# Patient Record
Sex: MALE | Race: WHITE | NOT HISPANIC OR LATINO | URBAN - METROPOLITAN AREA
[De-identification: names, ages, dates, MRNs, and addresses within clinical notes are randomized per-mention and may not be internally consistent; named-entity substitution may affect disease eponyms.]

---

## 2020-07-05 ENCOUNTER — HISTORICAL (OUTPATIENT)
Dept: URGENT CARE | Facility: CLINIC | Age: 30
End: 2020-07-05

## 2022-11-11 ENCOUNTER — OFFICE VISIT (OUTPATIENT)
Dept: FAMILY MEDICINE | Facility: CLINIC | Age: 32
End: 2022-11-11
Payer: COMMERCIAL

## 2022-11-11 VITALS
HEART RATE: 75 BPM | BODY MASS INDEX: 34.77 KG/M2 | TEMPERATURE: 99 F | OXYGEN SATURATION: 98 % | DIASTOLIC BLOOD PRESSURE: 75 MMHG | RESPIRATION RATE: 16 BRPM | HEIGHT: 71 IN | WEIGHT: 248.38 LBS | SYSTOLIC BLOOD PRESSURE: 120 MMHG

## 2022-11-11 DIAGNOSIS — F41.9 ANXIETY: ICD-10-CM

## 2022-11-11 DIAGNOSIS — Z28.21 INFLUENZA VACCINATION DECLINED BY PATIENT: Primary | ICD-10-CM

## 2022-11-11 DIAGNOSIS — Z00.00 WELLNESS EXAMINATION: ICD-10-CM

## 2022-11-11 DIAGNOSIS — Z13.220 NEED FOR LIPID SCREENING: ICD-10-CM

## 2022-11-11 PROCEDURE — 3078F PR MOST RECENT DIASTOLIC BLOOD PRESSURE < 80 MM HG: ICD-10-PCS | Mod: CPTII,,, | Performed by: FAMILY MEDICINE

## 2022-11-11 PROCEDURE — 3078F DIAST BP <80 MM HG: CPT | Mod: CPTII,,, | Performed by: FAMILY MEDICINE

## 2022-11-11 PROCEDURE — 1160F RVW MEDS BY RX/DR IN RCRD: CPT | Mod: CPTII,,, | Performed by: FAMILY MEDICINE

## 2022-11-11 PROCEDURE — 99203 OFFICE O/P NEW LOW 30 MIN: CPT | Mod: ,,, | Performed by: FAMILY MEDICINE

## 2022-11-11 PROCEDURE — 1159F PR MEDICATION LIST DOCUMENTED IN MEDICAL RECORD: ICD-10-PCS | Mod: CPTII,,, | Performed by: FAMILY MEDICINE

## 2022-11-11 PROCEDURE — 3008F BODY MASS INDEX DOCD: CPT | Mod: CPTII,,, | Performed by: FAMILY MEDICINE

## 2022-11-11 PROCEDURE — 3074F SYST BP LT 130 MM HG: CPT | Mod: CPTII,,, | Performed by: FAMILY MEDICINE

## 2022-11-11 PROCEDURE — 1160F PR REVIEW ALL MEDS BY PRESCRIBER/CLIN PHARMACIST DOCUMENTED: ICD-10-PCS | Mod: CPTII,,, | Performed by: FAMILY MEDICINE

## 2022-11-11 PROCEDURE — 99203 PR OFFICE/OUTPT VISIT, NEW, LEVL III, 30-44 MIN: ICD-10-PCS | Mod: ,,, | Performed by: FAMILY MEDICINE

## 2022-11-11 PROCEDURE — 1159F MED LIST DOCD IN RCRD: CPT | Mod: CPTII,,, | Performed by: FAMILY MEDICINE

## 2022-11-11 PROCEDURE — 3074F PR MOST RECENT SYSTOLIC BLOOD PRESSURE < 130 MM HG: ICD-10-PCS | Mod: CPTII,,, | Performed by: FAMILY MEDICINE

## 2022-11-11 PROCEDURE — 3008F PR BODY MASS INDEX (BMI) DOCUMENTED: ICD-10-PCS | Mod: CPTII,,, | Performed by: FAMILY MEDICINE

## 2022-11-11 RX ORDER — PROPRANOLOL HYDROCHLORIDE 60 MG/1
60 CAPSULE, EXTENDED RELEASE ORAL DAILY
Qty: 90 CAPSULE | Refills: 3 | Status: SHIPPED | OUTPATIENT
Start: 2022-11-11 | End: 2023-12-21

## 2022-11-11 NOTE — ASSESSMENT & PLAN NOTE
Will try propranolol 60 mg xl daily. Side effects discussed with patient. He verbalized understanding  RTC 4 wks for wellness  Will consider wellbutrin if this does not help patient

## 2022-11-11 NOTE — PROGRESS NOTES
Gómez DESIR Clause  11/11/2022  [id number]    SONIA RONDON MD  Patient Care Team:  Sonia Rondon MD as PCP - General (Family Medicine)      Chief Complaint:  Chief Complaint   Patient presents with    Establish Care     New Patient Establish Care       History of Present Illness:  HPI    32 y.o. male who presents today to establish care. He c/o increased stress, anxiety, irritability and feeling on edge.     Review of Systems    General: denies f/c, weight loss, night sweats, decreased appetite  Eye: denies blurred vision, changes in vision  Respiratory: denies sob, wheezing, cough  Cardiovascular: denies chest pain, palpitations, edema  Gastrointestinal: denies abdominal pain, n/v, constipation, diarrhea  Integumentary: denies rashes, pruritis      Health Maintenance  The patient has no Health Maintenance topics of status Not Due  Health Maintenance Due   Topic Date Due    Lipid Panel  Never done    COVID-19 Vaccine (1) Never done    Pneumococcal Vaccines (Age 0-64) (1 - PCV) Never done    TETANUS VACCINE  Never done       Exam:  Vitals:    11/11/22 1041   BP: 120/75   Pulse: 75   Resp: 16   Temp: 99.2 °F (37.3 °C)     Weight: 112.7 kg (248 lb 6.4 oz)   Body mass index is 34.64 kg/m².      Physical Exam  Constitutional: NAD, alert, pleasant  Respiratory: CTAB, no wheezes, rales or rhonchi. No accessory muscle use  Eyes: EOMI  Cardiovascular: RRR, No m/r/g. No JVD. No LE edema  Gastrointestinal: BS+, nontender, nondistended  Integumentary: warm, dry, intact  Psych: AA&Ox3      ICD-10-CM ICD-9-CM   1. Influenza vaccination declined by patient  Z28.21 V64.06   2. Anxiety  F41.9 300.00   3. Wellness examination  Z00.00 V70.0   4. Need for lipid screening  Z13.220 V77.91       1. Influenza vaccination declined by patient    2. Anxiety  Overview:  He c/o increased stress, anxiety, irritability and feeling on edge. He denies depression, si/hi, anhedonia. He states he has had a promotion at work and takes on  a lot more responsibility. PHQ-score neg. He has been on lexapro in the past but it made him numb.       Assessment & Plan:  Will try propranolol 60 mg xl daily. Side effects discussed with patient. He verbalized understanding  RTC 4 wks for wellness  Will consider wellbutrin if this does not help patient    Orders:  -     TSH; Future; Expected date: 11/11/2022  -     propranoloL (INDERAL LA) 60 MG 24 hr capsule; Take 1 capsule (60 mg total) by mouth once daily.  Dispense: 90 capsule; Refill: 3    3. Wellness examination  -     CBC Auto Differential; Future; Expected date: 11/11/2022  -     Comprehensive Metabolic Panel; Future; Expected date: 11/11/2022  -     Lipid Panel; Future; Expected date: 11/11/2022  -     TSH; Future; Expected date: 11/11/2022    4. Need for lipid screening  -     Lipid Panel; Future; Expected date: 11/11/2022       Follow up: Follow up in about 4 weeks (around 12/9/2022) for wellness with labs.      Care Plan/Goals: Reviewed   Goals    None

## 2022-11-18 LAB
CHOLEST SERPL-MSCNC: 155 MG/DL (ref 0–200)
LIPIDS, HDL CHOLESTEROL: 37.7
LIPIDS, LDL CHOLESTEROL: 94
TRIGL SERPL-MCNC: 119 MG/DL
VLDLC SERPL-MCNC: 24 MG/DL

## 2022-12-02 ENCOUNTER — OFFICE VISIT (OUTPATIENT)
Dept: FAMILY MEDICINE | Facility: CLINIC | Age: 32
End: 2022-12-02
Payer: COMMERCIAL

## 2022-12-02 ENCOUNTER — TELEPHONE (OUTPATIENT)
Dept: FAMILY MEDICINE | Facility: CLINIC | Age: 32
End: 2022-12-02

## 2022-12-02 VITALS
HEIGHT: 71 IN | DIASTOLIC BLOOD PRESSURE: 80 MMHG | OXYGEN SATURATION: 98 % | WEIGHT: 254.81 LBS | TEMPERATURE: 99 F | HEART RATE: 81 BPM | SYSTOLIC BLOOD PRESSURE: 125 MMHG | BODY MASS INDEX: 35.67 KG/M2 | RESPIRATION RATE: 16 BRPM

## 2022-12-02 DIAGNOSIS — Z00.00 WELLNESS EXAMINATION: Primary | ICD-10-CM

## 2022-12-02 DIAGNOSIS — J45.20 MILD INTERMITTENT ASTHMA WITHOUT COMPLICATION: ICD-10-CM

## 2022-12-02 DIAGNOSIS — Z91.89 PNEUMOCOCCAL VACCINATION INDICATED: ICD-10-CM

## 2022-12-02 DIAGNOSIS — F41.9 ANXIETY: ICD-10-CM

## 2022-12-02 DIAGNOSIS — F17.200 VAPING NICOTINE DEPENDENCE, NON-TOBACCO PRODUCT: ICD-10-CM

## 2022-12-02 PROCEDURE — 3074F SYST BP LT 130 MM HG: CPT | Mod: CPTII,,, | Performed by: FAMILY MEDICINE

## 2022-12-02 PROCEDURE — 3074F PR MOST RECENT SYSTOLIC BLOOD PRESSURE < 130 MM HG: ICD-10-PCS | Mod: CPTII,,, | Performed by: FAMILY MEDICINE

## 2022-12-02 PROCEDURE — 3079F PR MOST RECENT DIASTOLIC BLOOD PRESSURE 80-89 MM HG: ICD-10-PCS | Mod: CPTII,,, | Performed by: FAMILY MEDICINE

## 2022-12-02 PROCEDURE — 1160F RVW MEDS BY RX/DR IN RCRD: CPT | Mod: CPTII,,, | Performed by: FAMILY MEDICINE

## 2022-12-02 PROCEDURE — 99395 PREV VISIT EST AGE 18-39: CPT | Mod: 25,,, | Performed by: FAMILY MEDICINE

## 2022-12-02 PROCEDURE — 1159F MED LIST DOCD IN RCRD: CPT | Mod: CPTII,,, | Performed by: FAMILY MEDICINE

## 2022-12-02 PROCEDURE — 90677 PCV20 VACCINE IM: CPT | Mod: ,,, | Performed by: FAMILY MEDICINE

## 2022-12-02 PROCEDURE — 1160F PR REVIEW ALL MEDS BY PRESCRIBER/CLIN PHARMACIST DOCUMENTED: ICD-10-PCS | Mod: CPTII,,, | Performed by: FAMILY MEDICINE

## 2022-12-02 PROCEDURE — 99395 PR PREVENTIVE VISIT,EST,18-39: ICD-10-PCS | Mod: 25,,, | Performed by: FAMILY MEDICINE

## 2022-12-02 PROCEDURE — 90471 IMMUNIZATION ADMIN: CPT | Mod: ,,, | Performed by: FAMILY MEDICINE

## 2022-12-02 PROCEDURE — 3079F DIAST BP 80-89 MM HG: CPT | Mod: CPTII,,, | Performed by: FAMILY MEDICINE

## 2022-12-02 PROCEDURE — 3008F PR BODY MASS INDEX (BMI) DOCUMENTED: ICD-10-PCS | Mod: CPTII,,, | Performed by: FAMILY MEDICINE

## 2022-12-02 PROCEDURE — 3008F BODY MASS INDEX DOCD: CPT | Mod: CPTII,,, | Performed by: FAMILY MEDICINE

## 2022-12-02 PROCEDURE — 90471 PNEUMOCOCCAL CONJUGATE VACCINE 20-VALENT: ICD-10-PCS | Mod: ,,, | Performed by: FAMILY MEDICINE

## 2022-12-02 PROCEDURE — 1159F PR MEDICATION LIST DOCUMENTED IN MEDICAL RECORD: ICD-10-PCS | Mod: CPTII,,, | Performed by: FAMILY MEDICINE

## 2022-12-02 PROCEDURE — 90677 PNEUMOCOCCAL CONJUGATE VACCINE 20-VALENT: ICD-10-PCS | Mod: ,,, | Performed by: FAMILY MEDICINE

## 2022-12-02 NOTE — PROGRESS NOTES
Patient ID: [id number]     Chief Complaint: Annual Exam (Wellness Exam)        HPI:     Gómez Bowens is a 32 y.o. male here today for an annual wellness visit. He did not do labs.     Anxiety: currently on propranolol 60 mg XL daily         ----------------------------  Asthma     Past Surgical History:   Procedure Laterality Date    ADENOIDECTOMY      TONSILLECTOMY      VASECTOMY         Review of patient's allergies indicates:  No Known Allergies    Outpatient Medications Marked as Taking for the 12/2/22 encounter (Office Visit) with Sonia Nazario MD   Medication Sig Dispense Refill    propranoloL (INDERAL LA) 60 MG 24 hr capsule Take 1 capsule (60 mg total) by mouth once daily. 90 capsule 3       Social History     Socioeconomic History    Marital status:      Spouse name: CARISSA    Number of children: 3   Tobacco Use    Smoking status: Every Day     Types: Vaping with nicotine    Smokeless tobacco: Former     Types: Chew   Substance and Sexual Activity    Alcohol use: Yes     Alcohol/week: 12.0 standard drinks     Types: 12 Cans of beer per week    Drug use: Never    Sexual activity: Yes        Family History   Problem Relation Age of Onset    Hypertension Father     Hyperlipidemia Father         Patient Care Team:  Sonia Nazario MD as PCP - General (Family Medicine)       Subjective:     ROS    Constitutional: Denies Change in appetite. Denies Chills. Denies Fever. Denies Night sweats.  Eye: Denies changes in vision  ENT: Denies Decreased hearing. Denies Sore throat. Denies Swollen glands.  Respiratory: Denies Cough. Denies Shortness of breath. Denies Shortness of breath with exertion. Denies Wheezing.  Cardiovascular: DeniesChest pain at rest. Denies Chest pain with exertion. Denies Irregular heartbeat. Denies Palpitations. Denies Edema.  Gastrointestinal: Denies Abdominal pain. DeniesDiarrhea. Denies Nausea. Denies Vomiting. Denies Hematemesis or Hematochezia.  Genitourinary:  "Denies Dysuria. Denies Urinary frequency. Denies Urinary urgency. Denies Blood in urine.  Endocrine: Denies Cold intolerance. Denies Excessive thirst. Denies Heat intolerance. Denies Weight loss. Denies Weight gain.  Musculoskeletal: Denies Painful joints. Denies Weakness.  Integumentary: Denies Rash. Denies Itching. Denies Dry skin.  Neurologic: Denies Dizziness. Denies Fainting. Denies Headache.  Psychiatric: Denies Depression. Denies Anxiety. Denies Suicidal/Homicidal ideations.    All Other ROS: Negative except as stated in HPI.       Objective:     /80 (BP Location: Left arm, Patient Position: Sitting, BP Method: Large (Manual))   Pulse 81   Temp 98.8 °F (37.1 °C) (Temporal)   Resp 16   Ht 5' 11" (1.803 m)   Wt 115.6 kg (254 lb 12.8 oz)   SpO2 98%   BMI 35.54 kg/m²     Physical Exam    General: Alert and oriented, No acute distress.  Head: Normocephalic, Atraumatic.  Eye: Pupils are equal, round and reactive to light, Extraocular movements are intact, Sclera non-icteric.  Ears/Nose/Throat: TM+ light reflexes bilaterally. Normal, Mucosa moist,Clear. Teeth intact, no lesions of tongue, palate, mucosa.  Respiratory: Clear to auscultation bilaterally; No wheezes, rales or rhonchi, Non-labored respirations, Symmetrical chest wall expansion.  Cardiovascular: Regular rate and rhythm, S1/S2 normal, No murmurs, rubs or gallops.  Gastrointestinal: Soft, Non-tender, Non-distended, Normal bowel sounds, No palpable organomegaly.  Integumentary: Warm, Dry, Intact, No suspicious lesions or rashes.  Extremities: No clubbing, cyanosis or edema  Psychiatric: Normal interaction, Coherent speech, Euthymic mood, Appropriate affect       Assessment:     Problem List Items Addressed This Visit    None      Plan:       The patient has no Health Maintenance topics of status Not Due     Cervical Cancer Screening - Last Pap on. Follow up with [facility] Clinic for Pap/Pelvic.    Breast Cancer Screening - Last Mammogram on . Normal. " Follow up in 1 year    Colon Cancer Screening - Colonoscopy on . Follow up in 1 year . FIT Negative on .    Osteoporosis Screening - Last DEXA on . Results show Normal Bone Density. Follow up in 1 year    Eye Exam - Recommend annually.    Dental Exam - Recommend biannual exams.     Vaccinations -   There is no immunization history on file for this patient.       Exercise for a total of 150 min per week and eat a healthy diet  Stay up to date with all cancer screening discussed in visit  Immunizations due were discussed during visit  All health maintenance was reviewed with patient. Patient verbalized understanding. All questions were answered.     Medication List with Changes/Refills   Current Medications    PROPRANOLOL (INDERAL LA) 60 MG 24 HR CAPSULE    Take 1 capsule (60 mg total) by mouth once daily.       Start Date: 11/11/2022End Date: 2/9/2023          The patient's Health Maintenance was reviewed and the following appears to be due at this time:   Health Maintenance Due   Topic Date Due    Hepatitis C Screening  Never done    Lipid Panel  Never done    COVID-19 Vaccine (1) Never done    Pneumococcal Vaccines (Age 0-64) (1 - PCV) Never done    TETANUS VACCINE  Never done         No follow-ups on file. In addition to their scheduled follow up, the patient has also been instructed to follow up on as needed basis.

## 2022-12-02 NOTE — PROGRESS NOTES
Patient ID: [id number]     Chief Complaint: Annual Exam (Wellness Exam)        HPI:     Gómez Bowens is a 32 y.o. male here today for an annual wellness. Reviewed and discussed lab results. He did not have bloodwork completed prior to visit as ordered but will do so in the near future. Overall he feels well. No other complaints today.     Anxiety: well controlled with inderal. Denies depression, anhedonia, si/hi.     Asthma; has not had a flare in many years. Declines albuterol inhaler  ----------------------------  Asthma     Past Surgical History:   Procedure Laterality Date    ADENOIDECTOMY      TONSILLECTOMY      VASECTOMY         Review of patient's allergies indicates:  No Known Allergies    Outpatient Medications Marked as Taking for the 12/2/22 encounter (Office Visit) with Sonia Nazario MD   Medication Sig Dispense Refill    propranoloL (INDERAL LA) 60 MG 24 hr capsule Take 1 capsule (60 mg total) by mouth once daily. 90 capsule 3       Social History     Socioeconomic History    Marital status:      Spouse name: CARISSA    Number of children: 3   Tobacco Use    Smoking status: Every Day     Types: Vaping with nicotine    Smokeless tobacco: Former     Types: Chew   Substance and Sexual Activity    Alcohol use: Yes     Alcohol/week: 12.0 standard drinks     Types: 12 Cans of beer per week    Drug use: Never    Sexual activity: Yes        Family History   Problem Relation Age of Onset    Hypertension Father     Hyperlipidemia Father         Patient Care Team:  Sonia Nazario MD as PCP - General (Family Medicine)     Subjective:     ROS    See HPI for details    Constitutional: Denies Change in appetite. Denies Chills. Denies Fever. Denies Night sweats.  Eye: Denies Blurred vision. Denies Discharge. Denies Eye pain.  ENT: Denies Decreased hearing. Denies Sore throat. Denies Swollen glands.  Respiratory: Denies Cough. Denies Shortness of breath. Denies Shortness of breath with  "exertion. Denies Wheezing.  Cardiovascular: DeniesChest pain at rest. Denies Chest pain with exertion. Denies Irregular heartbeat. Denies Palpitations. Denies Edema.  Gastrointestinal: Denies Abdominal pain. DeniesDiarrhea. Denies Nausea. Denies Vomiting. Denies Hematemesis or Hematochezia.  Genitourinary: Denies Dysuria. Denies Urinary frequency. Denies Urinary urgency. Denies Blood in urine.  Integumentary: Denies Rash. Denies Itching. Denies Dry skin.  Psychiatric: Denies Depression. Denies Anxiety. Denies Suicidal/Homicidal ideations.    All Other ROS: Negative except as stated in HPI.       Objective:     /80 (BP Location: Left arm, Patient Position: Sitting, BP Method: Large (Manual))   Pulse 81   Temp 98.8 °F (37.1 °C) (Temporal)   Resp 16   Ht 5' 11" (1.803 m)   Wt 115.6 kg (254 lb 12.8 oz)   SpO2 98%   BMI 35.54 kg/m²     Physical Exam    General: Alert and oriented, No acute distress.  Head: Normocephalic, Atraumatic.  Eye: Pupils are equal, round and reactive to light, Extraocular movements are intact, Sclera non-icteric.  Ears/Nose/Throat: Tm+ light reflexes bilaterally. Normal, Mucosa moist,Clear. Teeth intact. NO lesions of tongue, palate, mucosa  Respiratory: Clear to auscultation bilaterally; No wheezes, rales or rhonchi,Non-labored respirations, Symmetrical chest wall expansion.  Cardiovascular: Regular rate and rhythm, S1/S2 normal, No murmurs, rubs or gallops.  Gastrointestinal: Soft, Non-tender, Non-distended, Normal bowel sounds, No palpable organomegaly.  Musculoskeletal: Normal range of motion.  Integumentary: Warm, Dry, Intact, No suspicious lesions or rashes.  Extremities: No clubbing, cyanosis or edema  Psychiatric: Normal interaction, Coherent speech, Euthymic mood, Appropriate affect       Assessment:       ICD-10-CM ICD-9-CM   1. Wellness examination  Z00.00 V70.0   2. Mild intermittent asthma without complication  J45.20 493.90   3. Anxiety  F41.9 300.00   4. Pneumococcal " vaccination indicated  Z91.89 V49.89   5. Vaping nicotine dependence, non-tobacco product  F17.200 305.1        Plan:       The patient has no Health Maintenance topics of status Not Due     Eye Exam - Recommend annually. Last Eye Exam - 1/2018.    Dental Exam - Recommend biannual exams.     Vaccinations - There is no immunization history for the selected administration types on file for this patient.     Problem List Items Addressed This Visit          Psychiatric    Anxiety    Overview     Well controlled with inderal. Failed lexapro in the past         Current Assessment & Plan     Continue inderal and rtc 1 year for wellness            Pulmonary    Mild intermittent asthma without complication    Overview     His asthma is well controlled. He has not had an attack in many years         Current Assessment & Plan     Smoking cessation encouraged            Other    Vaping nicotine dependence, non-tobacco product    Current Assessment & Plan     Counseled on cessation          Other Visit Diagnoses       Wellness examination    -  Primary    Relevant Orders    CBC Auto Differential    Comprehensive Metabolic Panel    Lipid Panel    Pneumococcal vaccination indicated        Relevant Orders    Pneumococcal Conjugate Vaccine (20 Valent) (IM)            Exercise for a total of 150 min per week and eat a healthy diet  Stay up to date with all cancer screening discussed in visit  Immunizations due were discussed during visit  All health maintenance was reviewed with patient. Patient verbalized understanding. All questions were answered.     Medication List with Changes/Refills   Current Medications    PROPRANOLOL (INDERAL LA) 60 MG 24 HR CAPSULE    Take 1 capsule (60 mg total) by mouth once daily.       Start Date: 11/11/2022End Date: 2/9/2023          The patient's Health Maintenance was reviewed and the following appears to be due at this time:   Health Maintenance Due   Topic Date Due    Lipid Panel  Never done     COVID-19 Vaccine (1) Never done    Pneumococcal Vaccines (Age 0-64) (1 - PCV) Never done    TETANUS VACCINE  Never done         Follow up in about 1 year (around 12/2/2023) for wellness with labs. In addition to their scheduled follow up, the patient has also been instructed to follow up on as needed basis.

## 2022-12-05 ENCOUNTER — DOCUMENTATION ONLY (OUTPATIENT)
Dept: FAMILY MEDICINE | Facility: CLINIC | Age: 32
End: 2022-12-05
Payer: COMMERCIAL

## 2022-12-05 ENCOUNTER — TELEPHONE (OUTPATIENT)
Dept: FAMILY MEDICINE | Facility: CLINIC | Age: 32
End: 2022-12-05
Payer: COMMERCIAL

## 2022-12-05 NOTE — TELEPHONE ENCOUNTER
I spoke with the patient and gave him the results. He voiced understanding and thank me for the call.

## 2022-12-05 NOTE — TELEPHONE ENCOUNTER
----- Message from Sonia Nazario MD sent at 12/5/2022  9:31 AM CST -----  Labs are wonderful. No further management needed.

## 2023-01-13 ENCOUNTER — TELEPHONE (OUTPATIENT)
Dept: URGENT CARE | Facility: CLINIC | Age: 33
End: 2023-01-13

## 2023-01-13 ENCOUNTER — OFFICE VISIT (OUTPATIENT)
Dept: URGENT CARE | Facility: CLINIC | Age: 33
End: 2023-01-13
Payer: COMMERCIAL

## 2023-01-13 VITALS
DIASTOLIC BLOOD PRESSURE: 84 MMHG | OXYGEN SATURATION: 95 % | HEIGHT: 71 IN | HEART RATE: 90 BPM | RESPIRATION RATE: 18 BRPM | SYSTOLIC BLOOD PRESSURE: 120 MMHG | BODY MASS INDEX: 36.12 KG/M2 | TEMPERATURE: 98 F | WEIGHT: 258 LBS

## 2023-01-13 DIAGNOSIS — R11.0 NAUSEA: ICD-10-CM

## 2023-01-13 DIAGNOSIS — R19.7 DIARRHEA, UNSPECIFIED TYPE: Primary | ICD-10-CM

## 2023-01-13 DIAGNOSIS — R53.83 FATIGUE, UNSPECIFIED TYPE: ICD-10-CM

## 2023-01-13 LAB
CTP QC/QA: YES
FLUAV AG NPH QL: NEGATIVE
FLUBV AG NPH QL: NEGATIVE

## 2023-01-13 PROCEDURE — 87804 INFLUENZA ASSAY W/OPTIC: CPT | Mod: 59,QW,, | Performed by: NURSE PRACTITIONER

## 2023-01-13 PROCEDURE — 3079F DIAST BP 80-89 MM HG: CPT | Mod: CPTII,,, | Performed by: NURSE PRACTITIONER

## 2023-01-13 PROCEDURE — 3079F PR MOST RECENT DIASTOLIC BLOOD PRESSURE 80-89 MM HG: ICD-10-PCS | Mod: CPTII,,, | Performed by: NURSE PRACTITIONER

## 2023-01-13 PROCEDURE — 1159F PR MEDICATION LIST DOCUMENTED IN MEDICAL RECORD: ICD-10-PCS | Mod: CPTII,,, | Performed by: NURSE PRACTITIONER

## 2023-01-13 PROCEDURE — 1160F PR REVIEW ALL MEDS BY PRESCRIBER/CLIN PHARMACIST DOCUMENTED: ICD-10-PCS | Mod: CPTII,,, | Performed by: NURSE PRACTITIONER

## 2023-01-13 PROCEDURE — 3074F PR MOST RECENT SYSTOLIC BLOOD PRESSURE < 130 MM HG: ICD-10-PCS | Mod: CPTII,,, | Performed by: NURSE PRACTITIONER

## 2023-01-13 PROCEDURE — 3008F PR BODY MASS INDEX (BMI) DOCUMENTED: ICD-10-PCS | Mod: CPTII,,, | Performed by: NURSE PRACTITIONER

## 2023-01-13 PROCEDURE — 87804 POCT INFLUENZA A/B: ICD-10-PCS | Mod: 59,QW,, | Performed by: NURSE PRACTITIONER

## 2023-01-13 PROCEDURE — 1159F MED LIST DOCD IN RCRD: CPT | Mod: CPTII,,, | Performed by: NURSE PRACTITIONER

## 2023-01-13 PROCEDURE — 1160F RVW MEDS BY RX/DR IN RCRD: CPT | Mod: CPTII,,, | Performed by: NURSE PRACTITIONER

## 2023-01-13 PROCEDURE — 99213 OFFICE O/P EST LOW 20 MIN: CPT | Mod: S$PBB,25,, | Performed by: NURSE PRACTITIONER

## 2023-01-13 PROCEDURE — 3008F BODY MASS INDEX DOCD: CPT | Mod: CPTII,,, | Performed by: NURSE PRACTITIONER

## 2023-01-13 PROCEDURE — 3074F SYST BP LT 130 MM HG: CPT | Mod: CPTII,,, | Performed by: NURSE PRACTITIONER

## 2023-01-13 PROCEDURE — 99213 PR OFFICE/OUTPT VISIT, EST, LEVL III, 20-29 MIN: ICD-10-PCS | Mod: S$PBB,25,, | Performed by: NURSE PRACTITIONER

## 2023-01-13 RX ORDER — DIAZEPAM 10 MG/1
TABLET ORAL
COMMUNITY
Start: 2022-09-30 | End: 2023-07-14

## 2023-01-13 RX ORDER — ONDANSETRON 4 MG/1
8 TABLET, ORALLY DISINTEGRATING ORAL 2 TIMES DAILY
Qty: 20 TABLET | Refills: 0 | Status: SHIPPED | OUTPATIENT
Start: 2023-01-13 | End: 2023-01-23

## 2023-01-13 NOTE — PROGRESS NOTES
"Subjective:       Patient ID: Gómez Bowens is a 32 y.o. male.    Vitals:  height is 5' 11" (1.803 m) and weight is 117 kg (258 lb). His oral temperature is 98 °F (36.7 °C). His blood pressure is 120/84 and his pulse is 90. His respiration is 18 and oxygen saturation is 95%.     Chief Complaint: Abdominal Pain (Diarrhea, headache, fever,dizziness x Tuesday.)    32-year-old male presents with diarrhea, generalized abdominal discomfort which has improved since 2 days ago.  Mild nausea without vomiting.  Intermittent periods of dizziness without recent injury or trauma.   States has been worse in the morning.  Dizziness yesterday morning, resolved now.    Gastrointestinal:  Positive for nausea and diarrhea. Negative for abdominal pain (none now), vomiting and constipation.   Neurological:  Positive for dizziness (intermittent). Negative for history of vertigo, passing out, facial drooping, speech difficulty, coordination disturbances, loss of balance, disorientation, altered mental status, loss of consciousness, numbness, tingling, seizures and tremors.   Psychiatric/Behavioral:  Negative for altered mental status and disorientation.      Objective:      Physical Exam   Constitutional: He is oriented to person, place, and time. He appears well-developed.   HENT:   Head: Normocephalic and atraumatic.   Ears:   Right Ear: External ear normal.   Left Ear: External ear normal.   Nose: Nose normal.   Mouth/Throat: Mucous membranes are normal.   Eyes: Conjunctivae and lids are normal.   Neck: Trachea normal. Neck supple.   Cardiovascular: Normal rate, regular rhythm and normal heart sounds.   Pulmonary/Chest: Effort normal and breath sounds normal. No respiratory distress.   Abdominal: Normal appearance and bowel sounds are normal. He exhibits no distension and no mass. Soft. There is no abdominal tenderness.   Musculoskeletal: Normal range of motion.         General: Normal range of motion.   Neurological: He is alert and " oriented to person, place, and time. He has normal strength.   Skin: Skin is warm, dry, intact, not diaphoretic and not pale.   Psychiatric: His speech is normal and behavior is normal. Judgment and thought content normal.   Nursing note and vitals reviewed.      Assessment:       1. Diarrhea, unspecified type    2. Fatigue, unspecified type    3. Nausea          Office Visit on 01/13/2023   Component Date Value Ref Range Status    Rapid Influenza A Ag 01/13/2023 Negative  Negative Final    Rapid Influenza B Ag 01/13/2023 Negative  Negative Final     Acceptable 01/13/2023 Yes   Final        Plan:     Take zofran as needed for nausea.  Immodium OTC for diarrhea  Rest and stay hydrated.  Take tylenol/motrin as needed for pain/fever.  Eat a bland diet for the next few days while your stomach recovers.  Please follow up with your primary care provider within 2-5 days if your signs and symptoms have not resolved or worsen.   If your condition worsens or fails to improve we recommend that you receive another evaluation at the emergency room immediately or contact your primary medical clinic to discuss your concerns.               Diarrhea, unspecified type  -     POCT Influenza A/B    Fatigue, unspecified type  -     POCT Influenza A/B    Nausea  -     ondansetron (ZOFRAN-ODT) 4 MG TbDL; Take 2 tablets (8 mg total) by mouth 2 (two) times daily. for 10 days  Dispense: 20 tablet; Refill: 0

## 2023-01-13 NOTE — PATIENT INSTRUCTIONS
Take zofran as needed for nausea.  Immodium OTC for diarrhea  Rest and stay hydrated.  Take tylenol/motrin as needed for pain/fever.  Eat a bland diet for the next few days while your stomach recovers.  Please follow up with your primary care provider within 2-5 days if your signs and symptoms have not resolved or worsen.   If your condition worsens or fails to improve we recommend that you receive another evaluation at the emergency room immediately or contact your primary medical clinic to discuss your concerns.

## 2023-01-13 NOTE — TELEPHONE ENCOUNTER
Spoke with pt regarding update on referral sent to [facility]. Pt requests referral cancellation.

## 2023-07-05 ENCOUNTER — OFFICE VISIT (OUTPATIENT)
Dept: URGENT CARE | Facility: CLINIC | Age: 33
End: 2023-07-05
Payer: COMMERCIAL

## 2023-07-05 VITALS
HEART RATE: 105 BPM | OXYGEN SATURATION: 96 % | SYSTOLIC BLOOD PRESSURE: 138 MMHG | RESPIRATION RATE: 18 BRPM | HEIGHT: 71 IN | DIASTOLIC BLOOD PRESSURE: 89 MMHG | BODY MASS INDEX: 37.8 KG/M2 | WEIGHT: 270 LBS | TEMPERATURE: 99 F

## 2023-07-05 DIAGNOSIS — M54.50 LUMBAR BACK PAIN: ICD-10-CM

## 2023-07-05 DIAGNOSIS — J02.9 SORE THROAT: Primary | ICD-10-CM

## 2023-07-05 DIAGNOSIS — L23.7 POISON IVY DERMATITIS: ICD-10-CM

## 2023-07-05 DIAGNOSIS — J02.9 ACUTE PHARYNGITIS, UNSPECIFIED ETIOLOGY: ICD-10-CM

## 2023-07-05 LAB
CTP QC/QA: YES
MOLECULAR STREP A: NEGATIVE

## 2023-07-05 PROCEDURE — 99214 PR OFFICE/OUTPT VISIT, EST, LEVL IV, 30-39 MIN: ICD-10-PCS | Mod: 25,,,

## 2023-07-05 PROCEDURE — 87651 POCT STREP A MOLECULAR: ICD-10-PCS | Mod: QW,,,

## 2023-07-05 PROCEDURE — 99214 OFFICE O/P EST MOD 30 MIN: CPT | Mod: 25,,,

## 2023-07-05 PROCEDURE — 96372 PR INJECTION,THERAP/PROPH/DIAG2ST, IM OR SUBCUT: ICD-10-PCS | Mod: ,,,

## 2023-07-05 PROCEDURE — 87651 STREP A DNA AMP PROBE: CPT | Mod: QW,,,

## 2023-07-05 PROCEDURE — 96372 THER/PROPH/DIAG INJ SC/IM: CPT | Mod: ,,,

## 2023-07-05 RX ORDER — PREDNISONE 20 MG/1
20 TABLET ORAL 2 TIMES DAILY
Qty: 10 TABLET | Refills: 0 | Status: SHIPPED | OUTPATIENT
Start: 2023-07-05 | End: 2023-07-14

## 2023-07-05 RX ORDER — DEXAMETHASONE SODIUM PHOSPHATE 100 MG/10ML
10 INJECTION INTRAMUSCULAR; INTRAVENOUS
Status: COMPLETED | OUTPATIENT
Start: 2023-07-05 | End: 2023-07-05

## 2023-07-05 RX ADMIN — DEXAMETHASONE SODIUM PHOSPHATE 10 MG: 100 INJECTION INTRAMUSCULAR; INTRAVENOUS at 08:07

## 2023-07-05 NOTE — LETTER
July 5, 2023      [facility]  [address], [address]  [place] [address]  Phone: [phone]  Fax: [phone]       Patient: Gómez Bowens   YOB: 1990  Date of Visit: 07/05/2023    To Whom It May Concern:    Elizabeth Bowens  was at [facility] on 07/05/2023. The patient may return to work/school on 07/07/2023 with no restrictions. If you have any questions or concerns, or if I can be of further assistance, please do not hesitate to contact me.    Sincerely,    Erika Garcia MA

## 2023-07-05 NOTE — PROGRESS NOTES
"Subjective:      Patient ID: Gómez Bowens is a 33 y.o. male.    Vitals:  height is 5' 11" (1.803 m) and weight is 122.5 kg (270 lb). His temperature is 98.6 °F (37 °C). His blood pressure is 138/89 and his pulse is 105. His respiration is 18 and oxygen saturation is 96%.     Chief Complaint: Sore Throat (Sore throat, body aches, back pain/soreness, x 2 days)    A 34 y/o male presents to the clinic with c/o sore throat x 2 days, middle back pain, and intermittent rash x 2 weeks. He denies any nasal congestion, fever, body aches, chills, neck stiffness or difficulty swallowing. He does not have any sick contacts.  He reports that he is a  and lifts and bends at work often and the back discomfort is intermittent worse after work. He denies any urinary symptoms, extremity weakness, saddle anesthesia, sudden loss of bowel or bladder function, or trauma. He also c/o a rash to his abdomen, he cuts limbs daily and is allergic to poison ivy, he reports that it is responsive to otc hydrocortisone cream but the rash does reoccur because he is exposed daily. He denies any shortness of breath, fever, cough, fatigue, headache, nasal congestion, or vomiting.     Sore Throat   Pertinent negatives include no congestion, coughing, shortness of breath, stridor or trouble swallowing.   Poison Ivy  Associated symptoms include a sore throat. Pertinent negatives include no congestion, cough, fatigue, fever or shortness of breath.     Constitution: Negative for chills, fatigue and fever.   HENT:  Positive for sore throat. Negative for congestion, trouble swallowing and voice change.    Cardiovascular: Negative.    Eyes: Negative.    Respiratory:  Negative for cough, shortness of breath, stridor and wheezing.    Gastrointestinal: Negative.    Musculoskeletal:  Positive for pain. Negative for trauma.   Skin:  Positive for rash.   Neurological:  Negative for numbness and tingling.    Objective:     Physical Exam   Constitutional: He " is oriented to person, place, and time. He appears well-developed. He is cooperative.  Non-toxic appearance. He does not appear ill. No distress.   HENT:   Head: Normocephalic and atraumatic.   Ears:   Right Ear: Hearing, tympanic membrane and external ear normal.   Left Ear: Hearing, tympanic membrane and external ear normal.   Nose: No congestion (clear pnd).   Mouth/Throat: Mucous membranes are normal. Posterior oropharyngeal erythema present. No oropharyngeal exudate.   Eyes: Conjunctivae and lids are normal.   Neck: Trachea normal and phonation normal. Neck supple. No edema present. No erythema present. No neck rigidity present.   Cardiovascular: Normal rate, regular rhythm and normal heart sounds.   Pulmonary/Chest: Effort normal and breath sounds normal. No stridor. No respiratory distress. He has no decreased breath sounds. He has no wheezes. He has no rhonchi. He has no rales.   Abdominal: Normal appearance.   Musculoskeletal:      Thoracic back: Normal.      Lumbar back: He exhibits tenderness. He exhibits normal range of motion, no bony tenderness, no swelling, no edema and no spasm.      Comments: Paraspinal tenderness to palpation on the right lumbar area, no vertebral tenderness; full ROM noted with only mild discomfort. Muscle strength 5/5 to lower extremities bilaterally    Neurological: no focal deficit. He is alert and oriented to person, place, and time. He has normal sensation. He exhibits normal muscle tone. Gait normal. GCS eye subscore is 4. GCS verbal subscore is 5. GCS motor subscore is 6.   Skin: Skin is warm, intact, not diaphoretic and rash (erythematous maculopapular rash in two clusters to middle abdomen). Capillary refill takes less than 2 seconds.   Psychiatric: His speech is normal and behavior is normal. Mood and thought content normal.   Nursing note and vitals reviewed.    Assessment:     1. Sore throat    2. Lumbar back pain    3. Acute pharyngitis, unspecified etiology    4.  Poison ivy dermatitis        Plan:       Sore throat  -     POCT Strep A, Molecular    Lumbar back pain    Acute pharyngitis, unspecified etiology    Poison ivy dermatitis    Other orders  -     dexAMETHasone injection 10 mg  -     predniSONE (DELTASONE) 20 MG tablet; Take 1 tablet (20 mg total) by mouth 2 (two) times daily. for 5 days  Dispense: 10 tablet; Refill: 0      Back pain  Start the oral steroids tomorrow afternoon if the rash persist.   Drink plenty of fluids and get plenty of rest.  Take medication with food.   Lower back stretches daily.  Avoid strenuous lifting, use proper body mechanics.  Apply heating pad, Ice pack, Biofreeze or Epsom salt baths as needed.  Follow-up with your primary care doctor as needed, if back pain persist call the clinic and we can send an orthopedic referral or contact your pcp.   Present to the Emergency Department with any significant change or worsening symptoms.    Pharyngitis   Medications sent to pharmacy  Flonase or clarain as needed for post nasal drip  Monitor for fever  Tylenol or ibuprofen as needed  Warm saltwater gargles  Hydrate  Return to clinic or seek medical attention immediately if his symptoms persist or worsen     Dermatitis   Start oral steroids tomorrow   Take non-sedating oral antihistamine like zyrtec or Claritin during the day and benadryl at night for itching; caution with sedation with the benadryl   Return to clinic or follow up with pcp if rash persist or worsens over the next few days

## 2023-07-05 NOTE — PATIENT INSTRUCTIONS
Back pain  Start the oral steroids tomorrow afternoon if the rash persist.   Drink plenty of fluids and get plenty of rest.  Take medication with food.   Lower back stretches daily.  Avoid strenuous lifting, use proper body mechanics.  Apply heating pad, Ice pack, Biofreeze or Epsom salt baths as needed.  Follow-up with your primary care doctor as needed, if back pain persist call the clinic and we can send an orthopedic referral or contact your pcp.   Present to the Emergency Department with any significant change or worsening symptoms.    Pharyngitis   Medications sent to pharmacy  Flonase or clarain as needed for post nasal drip  Monitor for fever  Tylenol or ibuprofen as needed  Warm saltwater gargles  Hydrate  Return to clinic or seek medical attention immediately if his symptoms persist or worsen     Dermatitis   Start oral steroids tomorrow   Take non-sedating oral antihistamine like zyrtec or Claritin during the day and benadryl at night for itching; caution with sedation with the benadryl   Return to clinic or follow up with pcp if rash persist or worsens over the next few days

## 2023-07-14 ENCOUNTER — OFFICE VISIT (OUTPATIENT)
Dept: FAMILY MEDICINE | Facility: CLINIC | Age: 33
End: 2023-07-14
Payer: COMMERCIAL

## 2023-07-14 VITALS
OXYGEN SATURATION: 98 % | DIASTOLIC BLOOD PRESSURE: 72 MMHG | HEART RATE: 83 BPM | TEMPERATURE: 98 F | HEIGHT: 71 IN | BODY MASS INDEX: 39.87 KG/M2 | RESPIRATION RATE: 18 BRPM | WEIGHT: 284.81 LBS | SYSTOLIC BLOOD PRESSURE: 116 MMHG

## 2023-07-14 DIAGNOSIS — F41.9 ANXIETY: Primary | ICD-10-CM

## 2023-07-14 DIAGNOSIS — B00.9 HSV-1 (HERPES SIMPLEX VIRUS 1) INFECTION: ICD-10-CM

## 2023-07-14 DIAGNOSIS — R68.82 DECREASED LIBIDO WITHOUT SEXUAL DYSFUNCTION: ICD-10-CM

## 2023-07-14 DIAGNOSIS — H10.33 ACUTE CONJUNCTIVITIS OF BOTH EYES, UNSPECIFIED ACUTE CONJUNCTIVITIS TYPE: ICD-10-CM

## 2023-07-14 PROBLEM — R19.7 DIARRHEA: Status: RESOLVED | Noted: 2023-01-13 | Resolved: 2023-07-14

## 2023-07-14 PROBLEM — R11.0 NAUSEA: Status: RESOLVED | Noted: 2023-01-13 | Resolved: 2023-07-14

## 2023-07-14 PROCEDURE — 3074F SYST BP LT 130 MM HG: CPT | Mod: CPTII,,, | Performed by: FAMILY MEDICINE

## 2023-07-14 PROCEDURE — 1160F RVW MEDS BY RX/DR IN RCRD: CPT | Mod: CPTII,,, | Performed by: FAMILY MEDICINE

## 2023-07-14 PROCEDURE — 3078F DIAST BP <80 MM HG: CPT | Mod: CPTII,,, | Performed by: FAMILY MEDICINE

## 2023-07-14 PROCEDURE — 1159F PR MEDICATION LIST DOCUMENTED IN MEDICAL RECORD: ICD-10-PCS | Mod: CPTII,,, | Performed by: FAMILY MEDICINE

## 2023-07-14 PROCEDURE — 3074F PR MOST RECENT SYSTOLIC BLOOD PRESSURE < 130 MM HG: ICD-10-PCS | Mod: CPTII,,, | Performed by: FAMILY MEDICINE

## 2023-07-14 PROCEDURE — 1160F PR REVIEW ALL MEDS BY PRESCRIBER/CLIN PHARMACIST DOCUMENTED: ICD-10-PCS | Mod: CPTII,,, | Performed by: FAMILY MEDICINE

## 2023-07-14 PROCEDURE — 3008F PR BODY MASS INDEX (BMI) DOCUMENTED: ICD-10-PCS | Mod: CPTII,,, | Performed by: FAMILY MEDICINE

## 2023-07-14 PROCEDURE — 3078F PR MOST RECENT DIASTOLIC BLOOD PRESSURE < 80 MM HG: ICD-10-PCS | Mod: CPTII,,, | Performed by: FAMILY MEDICINE

## 2023-07-14 PROCEDURE — 99214 OFFICE O/P EST MOD 30 MIN: CPT | Mod: ,,, | Performed by: FAMILY MEDICINE

## 2023-07-14 PROCEDURE — 1159F MED LIST DOCD IN RCRD: CPT | Mod: CPTII,,, | Performed by: FAMILY MEDICINE

## 2023-07-14 PROCEDURE — 99214 PR OFFICE/OUTPT VISIT, EST, LEVL IV, 30-39 MIN: ICD-10-PCS | Mod: ,,, | Performed by: FAMILY MEDICINE

## 2023-07-14 PROCEDURE — 3008F BODY MASS INDEX DOCD: CPT | Mod: CPTII,,, | Performed by: FAMILY MEDICINE

## 2023-07-14 RX ORDER — FLUOXETINE 10 MG/1
10 CAPSULE ORAL DAILY
Qty: 30 CAPSULE | Refills: 2 | Status: SHIPPED | OUTPATIENT
Start: 2023-07-14 | End: 2023-12-21

## 2023-07-14 RX ORDER — ACYCLOVIR 50 MG/G
OINTMENT TOPICAL
Qty: 30 G | Refills: 6 | Status: SHIPPED | OUTPATIENT
Start: 2023-07-14 | End: 2023-08-13

## 2023-07-14 NOTE — PROGRESS NOTES
"Gómez DESIR Clause  07/14/2023  [id number]    SONIA RONDON MD  Patient Care Team:  Sonia Rondon MD as PCP - General (Family Medicine)      Chief Complaint:  Chief Complaint   Patient presents with    Follow-up     From an urgent care     Anxiety     Would like to discuss his medication        History of Present Illness:  HPI    33 y.o. male who presents today c/o bilateral scleral injection for a few days. He also was having posterior eye pain and pressure. He was using otc refresh drops. Denies blurred vision, drainage, headaches, sinus issues at the time but was recently ill with a viral URI. He was on prednisone at the time for a viral URI and it did seem to help with the eye redness. He did have somewhat of a dry eye sensation. No allergy symptoms and no known sick contacts. He did go eat at a steak bar and was exposed to smoke two days prior.         Review of Systems  General: denies f/c, weight loss, night sweats, decreased appetite  Eye: denies blurred vision, changes in vision  Respiratory: denies sob, wheezing, cough  Cardiovascular: denies chest pain, palpitations, edema  Gastrointestinal: denies abdominal pain, n/v, constipation, diarrhea  Integumentary: denies rashes, pruritis        Health Maintenance  Health Maintenance Topics with due status: Not Due       Topic Last Completion Date    Influenza Vaccine Not Due     Health Maintenance Due   Topic Date Due    Hepatitis C Screening  Never done    COVID-19 Vaccine (1) Never done    TETANUS VACCINE  Never done       Exam:  Vitals:    07/14/23 1039   BP: 116/72   BP Location: Right arm   Patient Position: Sitting   Pulse: 83   Resp: 18   Temp: 98 °F (36.7 °C)   TempSrc: Oral   SpO2: 98%   Weight: 129.2 kg (284 lb 12.8 oz)   Height: 5' 11" (1.803 m)     Weight: 129.2 kg (284 lb 12.8 oz)   Body mass index is 39.72 kg/m².      Physical Exam  Constitutional: NAD, alert, pleasant  Respiratory: No accessory muscle use  Eyes: EOMI, PERRLA. "   Cardiovascular: RRR, No m/r/g. No JVD. No LE edema  Integumentary: warm, dry, intact. Cold sore to bottom li p  Psych: AA&Ox3      ICD-10-CM ICD-9-CM   1. Anxiety  F41.9 300.00   2. Decreased libido without sexual dysfunction  R68.82 799.81   3. HSV-1 (herpes simplex virus 1) infection  B00.9 054.9   4. Acute conjunctivitis of both eyes, unspecified acute conjunctivitis type  H10.33 372.00       1. Anxiety  Overview:  No longer well controlled with inderal. Failed lexapro in the past    He feels irritable, anxious and is having some fatigue, decreased libido. Denies depression, si/h.    Assessment & Plan:  Will add prozac 10 mg daily. I counseled patient on adverse effects such as n/v, d/c, anxiety, worsening depression or SI/HI. Call 911 or go to ED if you have SI/HI.   Take medication at bedtime if it causes drowsiness during the day.  rtc 6 wks or sooner if needed    Orders:  -     FLUoxetine 10 MG capsule; Take 1 capsule (10 mg total) by mouth once daily.  Dispense: 30 capsule; Refill: 2    2. Decreased libido without sexual dysfunction  Comments:  will check tesosterone level  Orders:  -     Testosterone, Total, LC/MS/MS; Future; Expected date: 07/14/2023    3. HSV-1 (herpes simplex virus 1) infection  Overview:  Patient has a cold sore outbreak on bottom lip and would like refills on acyclovir ointment    Ointment sent in    Orders:  -     acyclovir 5% (ZOVIRAX) 5 % ointment; Apply topically 5 (five) times daily.  Dispense: 30 g; Refill: 6    4. Acute conjunctivitis of both eyes, unspecified acute conjunctivitis type  Assessment & Plan:  Symptoms have since resolved. No FH of autoimmune issues and no vision changes. Could be allergic conjunctivitis  rtc if symptoms return           Follow up: No follow-ups on file.      Care Plan/Goals: Reviewed   Goals    None

## 2023-07-14 NOTE — ASSESSMENT & PLAN NOTE
Symptoms have since resolved. No FH of autoimmune issues and no vision changes. Could be allergic conjunctivitis  rtc if symptoms return

## 2023-07-14 NOTE — ASSESSMENT & PLAN NOTE
Will add prozac 10 mg daily. I counseled patient on adverse effects such as n/v, d/c, anxiety, worsening depression or SI/HI. Call 911 or go to ED if you have SI/HI.   Take medication at bedtime if it causes drowsiness during the day.  rtc 6 wks or sooner if needed

## 2023-07-26 ENCOUNTER — DOCUMENTATION ONLY (OUTPATIENT)
Dept: FAMILY MEDICINE | Facility: CLINIC | Age: 33
End: 2023-07-26
Payer: COMMERCIAL

## 2023-07-26 NOTE — PROGRESS NOTES
Testosterone level is normal. Decreased libido could be a side effect of propranolol or a result of his anxiety. If prozac is working, he can stop the propranolol and see if this helps

## 2023-07-27 ENCOUNTER — TELEPHONE (OUTPATIENT)
Dept: FAMILY MEDICINE | Facility: CLINIC | Age: 33
End: 2023-07-27
Payer: COMMERCIAL

## 2023-07-27 NOTE — TELEPHONE ENCOUNTER
Spoke to pt. He states that the records he wanted us to request was the Testosterone level that we called him about on yesterday. I just wanted to make sure that we were not missing anything

## 2023-07-27 NOTE — TELEPHONE ENCOUNTER
----- Message from Ely De La Torre LPN sent at 7/26/2023  4:06 PM CDT -----  Regarding: FW: Lab results  Are records needed on pt from [email or web address] Pt called inquiring if we got anything which we didn't. Ms Johnston said that she didn't request any records on pt. Please advise.  ----- Message -----  From: Vickie Farr  Sent: 7/26/2023   4:06 PM CDT  To: Ely De La Torre LPN  Subject: RE: Lab results                                  I didn't request anything I wasn't told I needed to.  Thank you   ----- Message -----  From: Ely De La Torre LPN  Sent: 7/26/2023   3:47 PM CDT  To: [facility] Support Staff  Subject: FW: Lab results                                  Did you request any records on pt from [facility] [[email or web address]]? Pt is asking if we got anything. I do not have anything on him.  ----- Message -----  From: Leah Farr  Sent: 7/25/2023  11:22 AM CDT  To: [facility] Staff  Subject: Lab results                                      .Type:  Needs Medical Advice    Who Called: Pt  Symptoms (please be specific):    How long has patient had these symptoms:    Pharmacy name and phone #:    Would the patient rather a call back or a response via [facility]? Call back  Best Call Back Number: [phone]  Additional Information: Pt following up on results from [facility] [[email or web address]], was told that clinic was going to retrieve results for him like last time.

## 2023-12-21 ENCOUNTER — OFFICE VISIT (OUTPATIENT)
Dept: FAMILY MEDICINE | Facility: CLINIC | Age: 33
End: 2023-12-21
Payer: COMMERCIAL

## 2023-12-21 VITALS
DIASTOLIC BLOOD PRESSURE: 82 MMHG | SYSTOLIC BLOOD PRESSURE: 128 MMHG | WEIGHT: 261.69 LBS | RESPIRATION RATE: 18 BRPM | BODY MASS INDEX: 36.64 KG/M2 | OXYGEN SATURATION: 98 % | HEART RATE: 73 BPM | TEMPERATURE: 98 F | HEIGHT: 71 IN

## 2023-12-21 DIAGNOSIS — E66.9 OBESITY (BMI 35.0-39.9 WITHOUT COMORBIDITY): ICD-10-CM

## 2023-12-21 DIAGNOSIS — E66.01 MORBID OBESITY: ICD-10-CM

## 2023-12-21 DIAGNOSIS — Z13.6 ENCOUNTER FOR LIPID SCREENING FOR CARDIOVASCULAR DISEASE: ICD-10-CM

## 2023-12-21 DIAGNOSIS — F41.9 ANXIETY: ICD-10-CM

## 2023-12-21 DIAGNOSIS — Z13.1 SCREENING FOR DIABETES MELLITUS: ICD-10-CM

## 2023-12-21 DIAGNOSIS — F17.200 VAPING NICOTINE DEPENDENCE, NON-TOBACCO PRODUCT: ICD-10-CM

## 2023-12-21 DIAGNOSIS — Z13.220 ENCOUNTER FOR LIPID SCREENING FOR CARDIOVASCULAR DISEASE: ICD-10-CM

## 2023-12-21 DIAGNOSIS — Z00.00 ENCOUNTER FOR WELLNESS EXAMINATION: Primary | ICD-10-CM

## 2023-12-21 PROBLEM — M46.07 SPINAL ENTHESOPATHY, LUMBOSACRAL REGION: Status: RESOLVED | Noted: 2023-12-21 | Resolved: 2023-12-21

## 2023-12-21 PROBLEM — H10.33 ACUTE CONJUNCTIVITIS OF BOTH EYES: Status: RESOLVED | Noted: 2023-07-14 | Resolved: 2023-12-21

## 2023-12-21 PROBLEM — M46.07 SPINAL ENTHESOPATHY, LUMBOSACRAL REGION: Status: ACTIVE | Noted: 2023-12-21

## 2023-12-21 PROBLEM — J45.20 MILD INTERMITTENT ASTHMA WITHOUT COMPLICATION: Chronic | Status: ACTIVE | Noted: 2022-12-02

## 2023-12-21 PROBLEM — J45.20 MILD INTERMITTENT ASTHMA WITHOUT COMPLICATION: Chronic | Status: RESOLVED | Noted: 2022-12-02 | Resolved: 2023-12-21

## 2023-12-21 PROCEDURE — 1159F MED LIST DOCD IN RCRD: CPT | Mod: CPTII,,, | Performed by: FAMILY MEDICINE

## 2023-12-21 PROCEDURE — 3079F DIAST BP 80-89 MM HG: CPT | Mod: CPTII,,, | Performed by: FAMILY MEDICINE

## 2023-12-21 PROCEDURE — 3008F BODY MASS INDEX DOCD: CPT | Mod: CPTII,,, | Performed by: FAMILY MEDICINE

## 2023-12-21 PROCEDURE — 3079F PR MOST RECENT DIASTOLIC BLOOD PRESSURE 80-89 MM HG: ICD-10-PCS | Mod: CPTII,,, | Performed by: FAMILY MEDICINE

## 2023-12-21 PROCEDURE — 1160F PR REVIEW ALL MEDS BY PRESCRIBER/CLIN PHARMACIST DOCUMENTED: ICD-10-PCS | Mod: CPTII,,, | Performed by: FAMILY MEDICINE

## 2023-12-21 PROCEDURE — 3008F PR BODY MASS INDEX (BMI) DOCUMENTED: ICD-10-PCS | Mod: CPTII,,, | Performed by: FAMILY MEDICINE

## 2023-12-21 PROCEDURE — 1159F PR MEDICATION LIST DOCUMENTED IN MEDICAL RECORD: ICD-10-PCS | Mod: CPTII,,, | Performed by: FAMILY MEDICINE

## 2023-12-21 PROCEDURE — 99213 OFFICE O/P EST LOW 20 MIN: CPT | Mod: 25,,, | Performed by: FAMILY MEDICINE

## 2023-12-21 PROCEDURE — 3074F PR MOST RECENT SYSTOLIC BLOOD PRESSURE < 130 MM HG: ICD-10-PCS | Mod: CPTII,,, | Performed by: FAMILY MEDICINE

## 2023-12-21 PROCEDURE — 99395 PR PREVENTIVE VISIT,EST,18-39: ICD-10-PCS | Mod: ,,, | Performed by: FAMILY MEDICINE

## 2023-12-21 PROCEDURE — 1160F RVW MEDS BY RX/DR IN RCRD: CPT | Mod: CPTII,,, | Performed by: FAMILY MEDICINE

## 2023-12-21 PROCEDURE — 99213 PR OFFICE/OUTPT VISIT, EST, LEVL III, 20-29 MIN: ICD-10-PCS | Mod: 25,,, | Performed by: FAMILY MEDICINE

## 2023-12-21 PROCEDURE — 99395 PREV VISIT EST AGE 18-39: CPT | Mod: ,,, | Performed by: FAMILY MEDICINE

## 2023-12-21 PROCEDURE — 3074F SYST BP LT 130 MM HG: CPT | Mod: CPTII,,, | Performed by: FAMILY MEDICINE

## 2023-12-21 RX ORDER — FLUOXETINE 10 MG/1
10 CAPSULE ORAL 2 TIMES DAILY
Qty: 180 CAPSULE | Refills: 3 | Status: SHIPPED | OUTPATIENT
Start: 2023-12-21 | End: 2024-12-20

## 2023-12-21 NOTE — PROGRESS NOTES
"Gómez P Clause  12/21/2023  [id number]    Sonia Nazario MD  Patient Care Team:  Sonia Nazario MD as PCP - General (Family Medicine)      Chief Complaint:  Chief Complaint   Patient presents with    Follow-up     Discuss medications. Doesn't feel as though his meds are working as well. Feeling more agitated in the evenings.       History of Present Illness:    33 y.o. male who presents today    Review of Systems  General: denies f/c, weight loss, night sweats, decreased appetite  Eye: denies blurred vision, changes in vision  Respiratory: denies sob, wheezing, cough  Cardiovascular: denies chest pain, palpitations, edema  Gastrointestinal: denies abdominal pain, n/v, constipation, diarrhea  Integumentary: denies rashes, pruritis    Past Medical History  Past Medical History:   Diagnosis Date    Anxiety     Asthma     Lumbar and sacral arthritis        Medications  Medication List with Changes/Refills   Current Medications    FLUOXETINE 10 MG CAPSULE    Take 1 capsule (10 mg total) by mouth once daily.    PROPRANOLOL (INDERAL LA) 60 MG 24 HR CAPSULE    Take 1 capsule (60 mg total) by mouth once daily.       Past Surgical History:   Procedure Laterality Date    ADENOIDECTOMY      TONSILLECTOMY      VASECTOMY         SUBJECTIVE:  Health Maintenance  The patient has no Health Maintenance topics of status Not Due  Health Maintenance Due   Topic Date Due    Hepatitis C Screening  Never done    COVID-19 Vaccine (1) Never done    TETANUS VACCINE  Never done       Exam:  Vitals:    12/21/23 0746   BP: 128/82   BP Location: Right arm   Patient Position: Sitting   BP Method: Large (Automatic)   Pulse: 73   Resp: 18   Temp: 98.4 °F (36.9 °C)   TempSrc: Oral   SpO2: 98%   Weight: 118.7 kg (261 lb 11.2 oz)   Height: 5' 11" (1.803 m)     Weight: 118.7 kg (261 lb 11.2 oz)   Body mass index is 36.5 kg/m².      Physical Exam  Constitutional: NAD, alert, pleasant  Respiratory: CTAB, no wheezes, rales or rhonchi. No " accessory muscle use  Eyes: EOMI  Cardiovascular: RRR, No m/r/g. No JVD. No LE edema  Gastrointestinal: BS+, nontender, nondistended  Integumentary: warm, dry, intact  Psych: AA&Ox3      ICD-10-CM ICD-9-CM   1. Spinal enthesopathy, lumbosacral region  M46.07 720.1   2. Anxiety  F41.9 300.00       1. Spinal enthesopathy, lumbosacral region    2. Anxiety  Overview:  No longer well controlled with inderal. Failed lexapro in the past. Currently on prozac 10 mg.      Denies depression, si/h.           Follow up: No follow-ups on file.      Care Plan/Goals: Reviewed   Goals    None

## 2023-12-28 PROCEDURE — 85025 COMPLETE CBC W/AUTO DIFF WBC: CPT | Performed by: FAMILY MEDICINE

## 2023-12-28 PROCEDURE — 80053 COMPREHEN METABOLIC PANEL: CPT | Performed by: FAMILY MEDICINE

## 2023-12-28 PROCEDURE — 84443 ASSAY THYROID STIM HORMONE: CPT | Performed by: FAMILY MEDICINE

## 2023-12-28 PROCEDURE — 83036 HEMOGLOBIN GLYCOSYLATED A1C: CPT | Performed by: FAMILY MEDICINE

## 2023-12-28 PROCEDURE — 84403 ASSAY OF TOTAL TESTOSTERONE: CPT | Performed by: FAMILY MEDICINE

## 2023-12-28 PROCEDURE — 81001 URINALYSIS AUTO W/SCOPE: CPT | Performed by: FAMILY MEDICINE

## 2023-12-28 PROCEDURE — 80061 LIPID PANEL: CPT | Performed by: FAMILY MEDICINE

## 2024-07-12 DIAGNOSIS — F41.9 ANXIETY: ICD-10-CM

## 2024-07-15 RX ORDER — FLUOXETINE 10 MG/1
10 CAPSULE ORAL 2 TIMES DAILY
Qty: 180 CAPSULE | Refills: 3 | Status: SHIPPED | OUTPATIENT
Start: 2024-07-15

## 2024-09-04 ENCOUNTER — TELEPHONE (OUTPATIENT)
Dept: FAMILY MEDICINE | Facility: CLINIC | Age: 34
End: 2024-09-04
Payer: COMMERCIAL

## 2024-09-04 NOTE — TELEPHONE ENCOUNTER
----- Message from Azra Maguire sent at 9/4/2024  2:29 PM CDT -----  Regarding: med refill  .Who Called: Gómez DESIR Clause    Refill or New Rx:Refill  RX Name and Strength:FLUoxetine 10 MG capsule  How is the patient currently taking it? (ex. 1XDay):2xday  Is this a 30 day or 90 day RX:180  Local or Mail Order:local  List of preferred pharmacies on file (remove unneeded): [address]   Phone: [phone]  Fax:[phone]  Ordering Provider:Rahel      Preferred Method of Contact: Phone Call  Patient's Preferred Phone Number on File: [phone]   Best Call Back Number, if different: [phone]  Additional Information: pt needs authorization from  to refill prescription

## 2024-09-23 ENCOUNTER — TELEPHONE (OUTPATIENT)
Dept: FAMILY MEDICINE | Facility: CLINIC | Age: 34
End: 2024-09-23
Payer: COMMERCIAL

## 2025-01-06 ENCOUNTER — OFFICE VISIT (OUTPATIENT)
Dept: URGENT CARE | Facility: CLINIC | Age: 35
End: 2025-01-06
Payer: COMMERCIAL

## 2025-01-06 VITALS
DIASTOLIC BLOOD PRESSURE: 89 MMHG | TEMPERATURE: 98 F | RESPIRATION RATE: 18 BRPM | HEIGHT: 71 IN | SYSTOLIC BLOOD PRESSURE: 132 MMHG | HEART RATE: 66 BPM | OXYGEN SATURATION: 97 % | BODY MASS INDEX: 37.52 KG/M2 | WEIGHT: 268 LBS

## 2025-01-06 DIAGNOSIS — L23.7 POISON IVY DERMATITIS: Primary | ICD-10-CM

## 2025-01-06 PROCEDURE — 99213 OFFICE O/P EST LOW 20 MIN: CPT | Mod: 25,,, | Performed by: NURSE PRACTITIONER

## 2025-01-06 PROCEDURE — 96372 THER/PROPH/DIAG INJ SC/IM: CPT | Mod: ,,, | Performed by: NURSE PRACTITIONER

## 2025-01-06 RX ORDER — DEXAMETHASONE SODIUM PHOSPHATE 10 MG/ML
10 INJECTION INTRAMUSCULAR; INTRAVENOUS
Status: COMPLETED | OUTPATIENT
Start: 2025-01-06 | End: 2025-01-06

## 2025-01-06 RX ORDER — PREDNISONE 20 MG/1
20 TABLET ORAL 2 TIMES DAILY
Qty: 10 TABLET | Refills: 0 | Status: SHIPPED | OUTPATIENT
Start: 2025-01-06 | End: 2025-01-11

## 2025-01-06 RX ORDER — HYDROXYZINE HYDROCHLORIDE 25 MG/1
25 TABLET, FILM COATED ORAL 3 TIMES DAILY
Qty: 21 TABLET | Refills: 0 | Status: SHIPPED | OUTPATIENT
Start: 2025-01-06 | End: 2025-01-13

## 2025-01-06 RX ADMIN — DEXAMETHASONE SODIUM PHOSPHATE 10 MG: 10 INJECTION INTRAMUSCULAR; INTRAVENOUS at 08:01

## 2025-01-06 NOTE — PROGRESS NOTES
"Subjective:      Patient ID: Gómez Bowens is a 34 y.o. male.    Vitals:  height is 5' 11" (1.803 m) and weight is 121.6 kg (268 lb). His oral temperature is 98.1 °F (36.7 °C). His blood pressure is 132/89 and his pulse is 66. His respiration is 18 and oxygen saturation is 97%.     Chief Complaint: Rash     Patient is a 34 y.o. male who presents to urgent care with complaints of possible poison ivy rash on ear, face and scrotum , since last night. Positive exposure to poison ivy. Alleviating factors include cortisone cream with mild amount of relief.       Constitution: Negative.   HENT: Negative.     Neck: neck negative.   Cardiovascular: Negative.    Eyes: Negative.    Respiratory: Negative.     Gastrointestinal: Negative.    Endocrine: negative.   Genitourinary: Negative.    Musculoskeletal: Negative.    Skin:  Positive for rash.   Allergic/Immunologic: Negative.    Neurological: Negative.    Hematologic/Lymphatic: Negative.    Psychiatric/Behavioral: Negative.        Objective:     Physical Exam   Constitutional: He is oriented to person, place, and time. He appears well-developed. He is cooperative.   HENT:   Head: Normocephalic and atraumatic.   Ears:   Right Ear: Hearing, tympanic membrane, external ear and ear canal normal.   Left Ear: Hearing, tympanic membrane, external ear and ear canal normal.   Nose: Nose normal. No mucosal edema or nasal deformity. No epistaxis. Right sinus exhibits no maxillary sinus tenderness and no frontal sinus tenderness. Left sinus exhibits no maxillary sinus tenderness and no frontal sinus tenderness.   Mouth/Throat: Uvula is midline, oropharynx is clear and moist and mucous membranes are normal. Mucous membranes are moist. No trismus in the jaw. Normal dentition. No uvula swelling.   Eyes: Conjunctivae and lids are normal.   Neck: Trachea normal and phonation normal. Neck supple.   Cardiovascular: Normal rate, regular rhythm, normal heart sounds and normal pulses. " "  Pulmonary/Chest: Effort normal and breath sounds normal.   Abdominal: Normal appearance and bowel sounds are normal. Soft.   Musculoskeletal: Normal range of motion.         General: Normal range of motion.   Neurological: He is alert and oriented to person, place, and time. He exhibits normal muscle tone.   Skin: Skin is warm, dry, intact, rash and urticarial. Capillary refill takes less than 2 seconds.        Psychiatric: His speech is normal and behavior is normal. Judgment and thought content normal.   Nursing note and vitals reviewed.       Previous History      Review of patient's allergies indicates:   Allergen Reactions    Azithromycin Diarrhea       Past Medical History:   Diagnosis Date    Anxiety     Asthma     Lumbar and sacral arthritis      Current Outpatient Medications   Medication Instructions    FLUoxetine 10 mg, Oral, 2 times daily    hydrOXYzine HCL (ATARAX) 25 mg, Oral, 3 times daily    predniSONE (DELTASONE) 20 mg, Oral, 2 times daily     Past Surgical History:   Procedure Laterality Date    ADENOIDECTOMY      TONSILLECTOMY      VASECTOMY       Family History   Problem Relation Name Age of Onset    No Known Problems Mother      Hypertension Father      Hyperlipidemia Father      No Known Problems Sister         Social History     Tobacco Use    Smoking status: Former     Types: Vaping with nicotine    Smokeless tobacco: Former     Types: Chew   Substance Use Topics    Alcohol use: Yes     Alcohol/week: 12.0 standard drinks of alcohol     Types: 12 Cans of beer per week    Drug use: Never        Physical Exam      Vital Signs Reviewed   /89 (Patient Position: Sitting)   Pulse 66   Temp 98.1 °F (36.7 °C) (Oral)   Resp 18   Ht 5' 11" (1.803 m)   Wt 121.6 kg (268 lb)   SpO2 97%   BMI 37.38 kg/m²        Procedures    Procedures     Labs     Results for orders placed or performed in visit on 12/28/23   Comprehensive Metabolic Panel    Collection Time: 12/28/23  7:40 " AM   Result Value Ref Range    Sodium 142 136 - 145 mmol/L    Potassium 4.4 3.5 - 5.1 mmol/L    Chloride 105 98 - 107 mmol/L    CO2 30 (H) 22 - 29 mmol/L    Glucose 87 74 - 100 mg/dL    Blood Urea Nitrogen 13.1 8.9 - 20.6 mg/dL    Creatinine 0.90 0.73 - 1.18 mg/dL    Calcium 9.4 8.4 - 10.2 mg/dL    Protein Total 7.4 6.4 - 8.3 gm/dL    Albumin 4.5 3.5 - 5.0 g/dL    Globulin 2.9 2.4 - 3.5 gm/dL    Albumin/Globulin Ratio 1.6 1.1 - 2.0 ratio    Bilirubin Total 0.9 <=1.5 mg/dL    ALP 74 40 - 150 unit/L    ALT 35 0 - 55 unit/L    AST 24 5 - 34 unit/L    eGFR >60 mls/min/1.73/m2   Lipid Panel    Collection Time: 12/28/23  7:40 AM   Result Value Ref Range    Cholesterol Total 196 <=200 mg/dL    HDL Cholesterol 50 35 - 60 mg/dL    Triglyceride 106 34 - 140 mg/dL    Cholesterol/HDL Ratio 4 0 - 5    Very Low Density Lipoprotein 21     LDL Cholesterol 125.00 50.00 - 140.00 mg/dL   Testosterone, Total, LC/MS/MS    Collection Time: 12/28/23  7:40 AM   Result Value Ref Range    Testosterone, LC-MS/ 300 - 1080 ng/dL   TSH    Collection Time: 12/28/23  7:40 AM   Result Value Ref Range    TSH 0.972 0.350 - 4.940 uIU/mL   Hemoglobin A1C    Collection Time: 12/28/23  7:40 AM   Result Value Ref Range    Hemoglobin A1c 5.5 <=7.0 %    Estimated Average Glucose 111.2 mg/dL   Urinalysis    Collection Time: 12/28/23  7:40 AM   Result Value Ref Range    Color, UA Light-Yellow Yellow, Light-Yellow, Colorless, Straw, Dark-Yellow    Appearance, UA Clear Clear    Specific Gravity, UA 1.020 1.005 - 1.030    pH, UA 6.5 5.0 - 8.5    Protein, UA Negative Negative    Glucose, UA Normal Negative, Normal    Ketones, UA Negative Negative    Blood, UA Negative Negative    Bilirubin, UA Negative Negative    Urobilinogen, UA Normal 0.2, 1.0, Normal    Nitrites, UA Negative Negative    Leukocyte Esterase, UA Negative Negative    WBC, UA 0-5 None Seen, 0-2, 3-5, 0-5 /HPF    Bacteria, UA None Seen None Seen, Trace /HPF    Squamous Epithelial Cells, UA  None Seen None Seen /HPF    RBC, UA 0-5 None Seen, 0-2, 3-5, 0-5 /HPF   CBC with Differential    Collection Time: 12/28/23  7:40 AM   Result Value Ref Range    WBC 6.09 4.50 - 11.50 x10(3)/mcL    RBC 5.34 4.70 - 6.10 x10(6)/mcL    Hgb 15.6 14.0 - 18.0 g/dL    Hct 48.1 42.0 - 52.0 %    MCV 90.1 80.0 - 94.0 fL    MCH 29.2 27.0 - 31.0 pg    MCHC 32.4 (L) 33.0 - 36.0 g/dL    RDW 12.7 11.5 - 17.0 %    Platelet 295 130 - 400 x10(3)/mcL    MPV 10.8 (H) 7.4 - 10.4 fL    Neut % 56.0 %    Lymph % 33.0 %    Mono % 6.7 %    Eos % 3.6 %    Basophil % 0.5 %    Lymph # 2.01 0.6 - 4.6 x10(3)/mcL    Neut # 3.41 2.1 - 9.2 x10(3)/mcL    Mono # 0.41 0.1 - 1.3 x10(3)/mcL    Eos # 0.22 0 - 0.9 x10(3)/mcL    Baso # 0.03 <=0.2 x10(3)/mcL    IG# 0.01 0 - 0.04 x10(3)/mcL    IG% 0.2 %    NRBC% 0.0 %       Assessment:     1. Poison ivy dermatitis        Plan:   Poison ivy is a plant that can cause an itchy, uncomfortable rash on your skin. Poison ivy grows as a shrub or vine in woods, fields, and areas of thick underbrush. It has 3 bright green leaves on each stem that turn red in [place].    DISCHARGE INSTRUCTIONS:  Medicines:  Antiseptic or drying creams or ointments: These medicines may be used to dry out the rash and decrease the itching. These products may be available without a doctor's order.  Steroids: This medicine helps decrease itching and inflammation. It can be given as a cream to apply to your skin or as a pill.  Antihistamines: This medicine may help decrease itching and help you sleep. It is available without a doctor's order.  Take your medicine as directed. Contact your healthcare provider if you think your medicine is not helping or if you have side effects. Tell your provider if you are allergic to any medicine. Keep a list of the medicines, vitamins, and herbs you take. Include the amounts, and when and why you take them. Bring the list or the pill bottles to follow-up visits. Carry your medicine list with you in case of an  emergency.  Follow up with your doctor as directed:  Write down your questions so you remember to ask them during your visits.  How your poison ivy rash spreads:  You cannot spread poison ivy by touching your rash or the liquid from your blisters. Poison ivy is spread only if you scratch your skin while it still has oil on it. You may think your rash is spreading because new rashes appear over a number of days. This happens because areas covered by thin skin break out in a rash first. Your face or forearms may develop a rash before thicker areas, such as the palms of your hands.  Self-care:  Keep your rash clean and dry: Wash it with soap and water. Gently pat it dry with a clean towel.  Try not to scratch or rub your rash: This can cause your skin to become infected.  Use a compress on your rash: Dip a clean washcloth in cool water. Wring it out and place it on your rash. Leave the washcloth on your skin for 15 minutes. Do this at least 3 times per day.  Take a cornstarch or oatmeal bath: If your rash is too large to cover with wet washcloths, take 3 or 4 cornstarch baths daily. Mix 1 pound of cornstarch with a little water to make a paste. Add the paste to a tub full of water and mix well. You may also use colloidal oatmeal in the bath water. Use lukewarm water. Avoid hot water because it may cause your itching to increase.  Treatment options  The following list of medications are related to or used in the treatment of this condition.  Caladryl Clear  Calaclear  Clear Calamine  Callergy Clear  Caldyphen Clear      Medications sent to pharmacy  Start oral steroids tomorrow morning  Hydroxyzine is to help with itching but may cause drowsiness.  Do not drink alcohol or drive when taking it.  You can take Claritin or Zyrtec during the daytime and then the hydroxyzine at bedtime.    Return to clinic with any concerns      Poison ivy dermatitis  -     dexAMETHasone injection 10 mg  -     predniSONE (DELTASONE) 20 MG  tablet; Take 1 tablet (20 mg total) by mouth 2 (two) times daily. for 5 days  Dispense: 10 tablet; Refill: 0  -     hydrOXYzine HCL (ATARAX) 25 MG tablet; Take 1 tablet (25 mg total) by mouth 3 (three) times daily. for 7 days  Dispense: 21 tablet; Refill: 0

## 2025-01-06 NOTE — PATIENT INSTRUCTIONS
Poison ivy is a plant that can cause an itchy, uncomfortable rash on your skin. Poison ivy grows as a shrub or vine in woods, fields, and areas of thick underbrush. It has 3 bright green leaves on each stem that turn red in [place].    DISCHARGE INSTRUCTIONS:  Medicines:  Antiseptic or drying creams or ointments: These medicines may be used to dry out the rash and decrease the itching. These products may be available without a doctor's order.  Steroids: This medicine helps decrease itching and inflammation. It can be given as a cream to apply to your skin or as a pill.  Antihistamines: This medicine may help decrease itching and help you sleep. It is available without a doctor's order.  Take your medicine as directed. Contact your healthcare provider if you think your medicine is not helping or if you have side effects. Tell your provider if you are allergic to any medicine. Keep a list of the medicines, vitamins, and herbs you take. Include the amounts, and when and why you take them. Bring the list or the pill bottles to follow-up visits. Carry your medicine list with you in case of an emergency.  Follow up with your doctor as directed:  Write down your questions so you remember to ask them during your visits.  How your poison ivy rash spreads:  You cannot spread poison ivy by touching your rash or the liquid from your blisters. Poison ivy is spread only if you scratch your skin while it still has oil on it. You may think your rash is spreading because new rashes appear over a number of days. This happens because areas covered by thin skin break out in a rash first. Your face or forearms may develop a rash before thicker areas, such as the palms of your hands.  Self-care:  Keep your rash clean and dry: Wash it with soap and water. Gently pat it dry with a clean towel.  Try not to scratch or rub your rash: This can cause your skin to become infected.  Use a compress on your rash: Dip a clean washcloth in cool water.  Wring it out and place it on your rash. Leave the washcloth on your skin for 15 minutes. Do this at least 3 times per day.  Take a cornstarch or oatmeal bath: If your rash is too large to cover with wet washcloths, take 3 or 4 cornstarch baths daily. Mix 1 pound of cornstarch with a little water to make a paste. Add the paste to a tub full of water and mix well. You may also use colloidal oatmeal in the bath water. Use lukewarm water. Avoid hot water because it may cause your itching to increase.  Treatment options  The following list of medications are related to or used in the treatment of this condition.  Caladryl Clear  Calaclear  Clear Calamine  Callergy Clear  Caldyphen Clear      Medications sent to pharmacy  Start oral steroids tomorrow morning  Hydroxyzine is to help with itching but may cause drowsiness.  Do not drink alcohol or drive when taking it.  You can take Claritin or Zyrtec during the daytime and then the hydroxyzine at bedtime.    Return to clinic with any concerns

## 2025-01-17 NOTE — TELEPHONE ENCOUNTER
He should be talking about the total tesosterone level that we received and resulted to him already. What else is there that I need?    3 = A little assistance